# Patient Record
Sex: MALE | Race: WHITE | Employment: OTHER | URBAN - METROPOLITAN AREA
[De-identification: names, ages, dates, MRNs, and addresses within clinical notes are randomized per-mention and may not be internally consistent; named-entity substitution may affect disease eponyms.]

---

## 2017-01-01 ENCOUNTER — HOSPITAL ENCOUNTER (EMERGENCY)
Age: 82
Discharge: HOME | End: 2017-07-02
Attending: EMERGENCY MEDICINE
Payer: MEDICARE

## 2017-01-01 ENCOUNTER — HOME CARE VISIT (OUTPATIENT)
Dept: SCHEDULING | Facility: HOME HEALTH | Age: 82
End: 2017-01-01
Payer: MEDICARE

## 2017-01-01 ENCOUNTER — OFFICE VISIT (OUTPATIENT)
Dept: CARDIOLOGY CLINIC | Age: 82
End: 2017-01-01

## 2017-01-01 ENCOUNTER — TELEPHONE (OUTPATIENT)
Dept: CARDIOLOGY CLINIC | Age: 82
End: 2017-01-01

## 2017-01-01 ENCOUNTER — HOME CARE VISIT (OUTPATIENT)
Dept: HOSPICE | Facility: HOSPICE | Age: 82
End: 2017-01-01
Payer: MEDICARE

## 2017-01-01 ENCOUNTER — HOSPITAL ENCOUNTER (OUTPATIENT)
Dept: CT IMAGING | Age: 82
Discharge: HOME | End: 2017-01-10
Attending: INTERNAL MEDICINE
Payer: MEDICARE

## 2017-01-01 ENCOUNTER — HOSPICE ADMISSION (OUTPATIENT)
Dept: HOSPICE | Facility: HOSPICE | Age: 82
End: 2017-01-01
Payer: MEDICARE

## 2017-01-01 VITALS
BODY MASS INDEX: 16.23 KG/M2 | SYSTOLIC BLOOD PRESSURE: 152 MMHG | RESPIRATION RATE: 24 BRPM | OXYGEN SATURATION: 91 % | DIASTOLIC BLOOD PRESSURE: 75 MMHG | HEIGHT: 66 IN | TEMPERATURE: 98.2 F | HEART RATE: 58 BPM | WEIGHT: 101 LBS

## 2017-01-01 VITALS
OXYGEN SATURATION: 60 % | HEIGHT: 66 IN | RESPIRATION RATE: 28 BRPM | WEIGHT: 101.8 LBS | DIASTOLIC BLOOD PRESSURE: 58 MMHG | HEART RATE: 52 BPM | BODY MASS INDEX: 16.36 KG/M2 | SYSTOLIC BLOOD PRESSURE: 118 MMHG | TEMPERATURE: 97.3 F

## 2017-01-01 VITALS
OXYGEN SATURATION: 97 % | BODY MASS INDEX: 16.74 KG/M2 | DIASTOLIC BLOOD PRESSURE: 84 MMHG | HEART RATE: 53 BPM | WEIGHT: 104.2 LBS | HEIGHT: 66 IN | SYSTOLIC BLOOD PRESSURE: 162 MMHG | RESPIRATION RATE: 20 BRPM

## 2017-01-01 VITALS
WEIGHT: 101 LBS | OXYGEN SATURATION: 99 % | DIASTOLIC BLOOD PRESSURE: 86 MMHG | HEART RATE: 59 BPM | HEIGHT: 66 IN | SYSTOLIC BLOOD PRESSURE: 142 MMHG | BODY MASS INDEX: 16.23 KG/M2 | RESPIRATION RATE: 16 BRPM

## 2017-01-01 DIAGNOSIS — D50.9 IRON DEFICIENCY ANEMIA, UNSPECIFIED: Chronic | ICD-10-CM

## 2017-01-01 DIAGNOSIS — M54.5 CHRONIC MIDLINE LOW BACK PAIN, WITH SCIATICA PRESENCE UNSPECIFIED: ICD-10-CM

## 2017-01-01 DIAGNOSIS — I25.10 ATHEROSCLEROSIS OF NATIVE CORONARY ARTERY OF NATIVE HEART WITHOUT ANGINA PECTORIS: ICD-10-CM

## 2017-01-01 DIAGNOSIS — R00.1 SINUS BRADYCARDIA: ICD-10-CM

## 2017-01-01 DIAGNOSIS — I11.9 BENIGN HYPERTENSIVE HEART DISEASE WITHOUT HEART FAILURE: ICD-10-CM

## 2017-01-01 DIAGNOSIS — I48.92 ATRIAL FLUTTER, PAROXYSMAL (HCC): ICD-10-CM

## 2017-01-01 DIAGNOSIS — I50.22 CHRONIC SYSTOLIC HEART FAILURE (HCC): Primary | ICD-10-CM

## 2017-01-01 DIAGNOSIS — I42.0 DILATED CARDIOMYOPATHY (HCC): ICD-10-CM

## 2017-01-01 DIAGNOSIS — G89.29 CHRONIC MIDLINE LOW BACK PAIN, WITH SCIATICA PRESENCE UNSPECIFIED: ICD-10-CM

## 2017-01-01 DIAGNOSIS — I25.10 ATHEROSCLEROSIS OF NATIVE CORONARY ARTERY OF NATIVE HEART WITHOUT ANGINA PECTORIS: Primary | ICD-10-CM

## 2017-01-01 DIAGNOSIS — I47.1 ATRIAL TACHYCARDIA (HCC): ICD-10-CM

## 2017-01-01 DIAGNOSIS — I50.22 CHRONIC SYSTOLIC HEART FAILURE (HCC): ICD-10-CM

## 2017-01-01 DIAGNOSIS — I87.2 VENOUS INSUFFICIENCY: ICD-10-CM

## 2017-01-01 DIAGNOSIS — J43.1 PANLOBULAR EMPHYSEMA (HCC): ICD-10-CM

## 2017-01-01 DIAGNOSIS — I95.1 ORTHOSTATIC HYPOTENSION: ICD-10-CM

## 2017-01-01 DIAGNOSIS — J44.9 CHRONIC OBSTRUCTIVE PULMONARY DISEASE, UNSPECIFIED COPD TYPE (HCC): ICD-10-CM

## 2017-01-01 DIAGNOSIS — J84.112 IDIOPATHIC PULMONARY FIBROSIS (HCC): ICD-10-CM

## 2017-01-01 DIAGNOSIS — I50.23 ACUTE ON CHRONIC SYSTOLIC CONGESTIVE HEART FAILURE (HCC): Primary | ICD-10-CM

## 2017-01-01 DIAGNOSIS — I49.3 FREQUENT PVCS: ICD-10-CM

## 2017-01-01 DIAGNOSIS — Z71.89 ADVANCED CARE PLANNING/COUNSELING DISCUSSION: ICD-10-CM

## 2017-01-01 DIAGNOSIS — S61.219A LACERATION OF FINGER, INITIAL ENCOUNTER: Primary | ICD-10-CM

## 2017-01-01 DIAGNOSIS — R13.10 DYSPHAGIA, UNSPECIFIED TYPE: ICD-10-CM

## 2017-01-01 LAB
BUN SERPL-MCNC: 25 MG/DL (ref 10–36)
BUN/CREAT SERPL: 34 (ref 10–24)
CALCIUM SERPL-MCNC: 9 MG/DL (ref 8.6–10.2)
CHLORIDE SERPL-SCNC: 91 MMOL/L (ref 96–106)
CO2 SERPL-SCNC: 27 MMOL/L (ref 18–29)
CREAT SERPL-MCNC: 0.73 MG/DL (ref 0.76–1.27)
GLUCOSE SERPL-MCNC: 103 MG/DL (ref 65–99)
POTASSIUM SERPL-SCNC: 4.7 MMOL/L (ref 3.5–5.2)
SODIUM SERPL-SCNC: 135 MMOL/L (ref 134–144)
TSH SERPL DL<=0.005 MIU/L-ACNC: 1.08 UIU/ML (ref 0.45–4.5)

## 2017-01-01 PROCEDURE — G0299 HHS/HOSPICE OF RN EA 15 MIN: HCPCS

## 2017-01-01 PROCEDURE — 90471 IMMUNIZATION ADMIN: CPT

## 2017-01-01 PROCEDURE — 3336500001 HSPC ELECTION

## 2017-01-01 PROCEDURE — 71250 CT THORAX DX C-: CPT

## 2017-01-01 PROCEDURE — 77030010507 HC ADH SKN DERMBND J&J -B

## 2017-01-01 PROCEDURE — 74011250636 HC RX REV CODE- 250/636: Performed by: PHYSICIAN ASSISTANT

## 2017-01-01 PROCEDURE — HOSPICE MEDICATION HC HH HOSPICE MEDICATION

## 2017-01-01 PROCEDURE — 0651 HSPC ROUTINE HOME CARE

## 2017-01-01 PROCEDURE — 99285 EMERGENCY DEPT VISIT HI MDM: CPT

## 2017-01-01 PROCEDURE — 75810000293 HC SIMP/SUPERF WND  RPR

## 2017-01-01 PROCEDURE — 90715 TDAP VACCINE 7 YRS/> IM: CPT | Performed by: PHYSICIAN ASSISTANT

## 2017-01-01 RX ORDER — CARVEDILOL 6.25 MG/1
6.25 TABLET ORAL 2 TIMES DAILY WITH MEALS
Qty: 14 TAB | Refills: 3 | Status: SHIPPED | OUTPATIENT
Start: 2017-01-01

## 2017-01-01 RX ORDER — FUROSEMIDE 20 MG/1
20 TABLET ORAL DAILY
Qty: 30 TAB | Refills: 3
Start: 2017-01-01

## 2017-01-01 RX ORDER — CARVEDILOL 6.25 MG/1
TABLET ORAL
Qty: 180 TAB | Refills: 3 | Status: SHIPPED | OUTPATIENT
Start: 2017-01-01 | End: 2017-01-01 | Stop reason: SDUPTHER

## 2017-01-01 RX ORDER — CARVEDILOL 6.25 MG/1
3.12 TABLET ORAL 2 TIMES DAILY WITH MEALS
Qty: 180 TAB | Refills: 3
Start: 2017-01-01 | End: 2017-01-01 | Stop reason: SDUPTHER

## 2017-01-01 RX ADMIN — TETANUS TOXOID, REDUCED DIPHTHERIA TOXOID AND ACELLULAR PERTUSSIS VACCINE, ADSORBED 0.5 ML: 5; 2.5; 8; 8; 2.5 SUSPENSION INTRAMUSCULAR at 18:05

## 2017-02-21 NOTE — PROGRESS NOTES
HISTORY OF PRESENT ILLNESS  Reggie Person is a 80 y.o. male. Last seen 3 months ago. Problem List  Date Reviewed: 2/21/2017          Codes Class Noted    Dilated cardiomyopathy ([facility] ICD-10-CM: I42.0  ICD-9-CM: 425.4  11/28/2016        Chronic systolic heart failure ([facility] ICD-10-CM: I50.22  ICD-9-CM: 428.22  11/28/2016        Orthostatic hypotension ICD-10-CM: I95.1  ICD-9-CM: 458.0  10/14/2014        Iron deficiency anemia, unspecified (Chronic) ICD-10-CM: D50.9  ICD-9-CM: 280.9  4/16/2014        Venous insufficiency ICD-10-CM: I87.2  ICD-9-CM: 459.81  Unknown        Coronary atherosclerosis of native coronary artery ICD-10-CM: I25.10  ICD-9-CM: 414.01  Unknown        Benign hypertensive heart disease without heart failure ICD-10-CM: I11.9  ICD-9-CM: 402.10  Unknown        COPD (chronic obstructive pulmonary disease) ([facility] ICD-10-CM: J44.9  ICD-9-CM: 496  Unknown            Cardiac testing  CATH: 2004: Stent-LAD  ECHO: 2010 EF 40-45%, LAE, ? PFO  CATH: 2010 : non-obst CAD, EF: 35%, LV diffuse HK   ECHO: 12/13/12 - EF 40%, global HK, severe LAE, moderate NORBERTO, mild MR  ECHO: 1/2014: EF 55%, PA 50 mmHg  ECHO: 4/18/2014: EF 55%, grade 1 DD, mild MR/AI/TR  ECHO: 11/21/16 - dilated LVEF 20%, global HK, moderate MR.    HPI  Mr. Zabrina Green reports that his dyspnea remains his primary concern. He now reports dyspnea at rest. Continues to use oxygen therapy 24/7. Followed closely by Dr. Bipin Tapia. He also complaints of severe pain in his back and hips due to hx of compression fractures. He reports that he is able to sleep well throughout the night, usually laying on his back. He is on chronic pain medication as prescribed by Dr. Alejandra Caballero. He was recently diagnosed with skin cancer and is concerned about potential need for surgical intervention. Patient denies any exertional chest pain, palpitations, syncope, orthopnea, edema or paroxysmal nocturnal dyspnea. He is here today with his daughter.    Past Medical History   Diagnosis Date    Benign hypertensive heart disease without heart failure     COPD (chronic obstructive pulmonary disease) (HCC)      oxygen requiring    Coronary atherosclerosis of native coronary artery 1970's     MI, PCI LAD 2004    DDD (degenerative disc disease), lumbar      med rx    Interstitial lung disease ([facility]     Ischemic cardiomyopathy     PAD (peripheral artery disease) ([facility]      right iliac stent    Pure hypercholesterolemia     PVC's (premature ventricular contractions) 6/11/2012    Shingles 2/2012     thoracic region    Venous insufficiency      Current Outpatient Prescriptions   Medication Sig Dispense Refill    carvedilol (COREG) 6.25 mg tablet TAKE 1 TABLET TWICE A DAY WITH MEALS 180 Tab 3    MULTIVITAMIN PO Take  by mouth.  acetaminophen (TYLENOL) 325 mg tablet Take  by mouth every four (4) hours as needed for Pain.  albuterol-ipratropium (DUO-NEB) 2.5 mg-0.5 mg/3 ml nebulizer solution 3 mL by Nebulization route once.  IRON/VITAMIN B COMPLEX (GERITOL PO) Take  by mouth.  pantoprazole (PROTONIX) 40 mg tablet Take 40 mg by mouth daily.  MISCELLANEOUS MEDICAL SUPPLY (OXYGEN TUBING) 2.5 L by Does Not Apply route.  predniSONE (DELTASONE) 2.5 mg tablet Take 5 mg by mouth daily.  budesonide-formoterol (SYMBICORT) 160-4.5 mcg/Actuation HFA inhaler Take 2 Puffs by inhalation two (2) times a day.  tiotropium (SPIRIVA WITH HANDIHALER) 18 mcg inhalation capsule Take 1 Cap by inhalation daily.  atorvastatin (LIPITOR) 20 mg tablet Take 20 mg by mouth nightly. Allergies   Allergen Reactions    Ivp [Fd And C Blue No.1] Unknown (comments)     Review of Systems   Constitutional: Negative for fever, chills, fatigue and diaphoresis. Respiratory: Negative for cough, dyspnea, hemoptysis, sputum production and wheezing. Positive for dyspnea on exertion. Cardiovascular: Negative for chest pain, palpitations, orthopnea, claudication, leg swelling and PND. Gastrointestinal: Negative for heartburn, nausea, vomiting, blood in stool and melena. Genitourinary: Negative for dysuria and flank pain. Musculoskeletal: Positive for back pain and joint pain. Skin: Negative for rash. Neurological: Negative for focal weakness, seizures, loss of consciousness, and headaches. Positive for generalized weakness. Endo/Heme/Allergies: Does bruise easily. No abnormal bleeding. Psychiatric/Behavioral: Negative for memory loss. The patient does not have insomnia. Visit Vitals    /86 (BP 1 Location: Right arm, BP Patient Position: Sitting)    Pulse (!) 59    Resp 16    Ht 5' 6\" (1.676 m)    Wt 101 lb (45.8 kg)    SpO2 99%    BMI 16.3 kg/m2     Wt Readings from Last 3 Encounters:   02/21/17 101 lb (45.8 kg)   11/21/16 98 lb (44.5 kg)   05/16/16 104 lb (47.2 kg)     Physical Exam   Vitals reviewed. Constitutional: He is oriented to person, place, and time. Neck: Neck supple. No JVD present. Cardiovascular: Normal rate, regular rhythm, S1 normal, S2 normal and normal heart sounds. Frequent extrasystoles are present. Exam reveals no gallop and no friction rub. No murmur heard. Pulses:       Carotid pulses are 2+ on the right side, and 2+ on the left side. Dorsalis pedis pulses are 2+ on the right side, and 2+ on the left side. Pulmonary/Chest: Effort normal and breath sounds normal. He has no wheezes. He has no rales. Abdominal: Soft. Bowel sounds are normal.   Musculoskeletal: He exhibits no edema  Neurological: He is alert and oriented to person, place, and time. Skin: Skin is warm and dry. Psychiatric: He has a normal mood and affect. Cardiographics  EKG 4/30/15 - SR with frequent PVCs and rare couplets  EKG 5/16/16 - SR 92, frequent PAC/PVC's. EKG 11/2016 - SR, frequent PVC's, RBBB  ASSESSMENT and PLAN  Encounter Diagnoses   Name Primary?     Atherosclerosis of native coronary artery of native heart without angina pectoris Yes    Benign hypertensive heart disease without heart failure     Venous insufficiency     Orthostatic hypotension     Dilated cardiomyopathy (HCC)     Chronic systolic heart failure (HCC)     Chronic obstructive pulmonary disease, unspecified COPD type (HCC)     Iron deficiency anemia, unspecified      Mr Farida Gunderson has chronic systolic heart failure, likely non-ischemic in etiology. Chronic class 3 dyspnea is likely multifactorial in the setting of advanced COPD. He has no signs or symptoms of volume overload. His functional capacity is limited by chronic low back pain. He has known CAD with remote PCI. Cath 2010 demonstrated non obstructive disease. No symptoms of exertional angina. Given his advanced age, conservative management is best. He has a tendency towards ortho stasis thus will avoid ARB at this time. We reviewed the signs and symptoms for volume overload. Will continue to monitor. Follow-up Disposition:  Return in about 4 months (around 6/21/2017).      CACHORRO Rehman MD

## 2017-02-21 NOTE — MR AVS SNAPSHOT
Visit Information Date & Time Provider Department Dept. Phone Encounter #  
 2/21/2017  1:40 PM Ho Thomas MD [facility] [phone] [id number] Follow-up Instructions Return in about 4 months (around 6/21/2017). Upcoming Health Maintenance Date Due DTaP/Tdap/Td series (1 - Tdap) 5/23/1946 ZOSTER VACCINE AGE 60> 5/23/1985 GLAUCOMA SCREENING Q2Y 5/23/1990 MEDICARE YEARLY EXAM 5/23/1990 Pneumococcal 65+ Low/Medium Risk (2 of 2 - PCV13) 4/10/2015 INFLUENZA AGE 9 TO ADULT 8/1/2016 Allergies as of 2/21/2017  Review Complete On: 2/21/2017 By: Meliza Grimes NP Severity Noted Reaction Type Reactions Ivp [Fd And C Blue No.1]  12/29/2010    Unknown (comments) Current Immunizations  Never Reviewed Name Date Pneumococcal Polysaccharide (PPSV-23) 4/10/2014 11:03 AM  
  
 Not reviewed this visit You Were Diagnosed With   
  
 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 4 Chronic systolic heart failure (HCC)     ICD-10-CM: I50.22 ICD-9-CM: 428.22 Chronic obstructive pulmonary disease, unspecified COPD type ([facility]     ICD-10-CM: J44.9 ICD-9-CM: 572 Iron deficiency anemia, unspecified     ICD-10-CM: D50.9 ICD-9-CM: 280.9 Vitals BP Pulse Resp Height(growth percentile) Weight(growth percentile) SpO2  
 142/86 (BP 1 Location: Right arm, BP Patient Position: Sitting) (!) 59 16 5' 6\" (1.676 m) 101 lb (45.8 kg) 99% BMI Smoking Status 16.3 kg/m2 Former Smoker Vitals History BMI and BSA Data Body Mass Index Body Surface Area  
 16.3 kg/m 2 1.46 m 2 Preferred Pharmacy Pharmacy Name [address] [phone] Your Updated Medication List  
  
   
This list is accurate as of: 2/21/17  2:52 PM.  Always use your most recent med list.  
  
  
  
  
 acetaminophen 325 mg tablet Commonly known as:  TYLENOL Take  by mouth every four (4) hours as needed for Pain. albuterol-ipratropium 2.5 mg-0.5 mg/3 ml Nebu Commonly known as:  DUO-NEB  
3 mL by Nebulization route once. carvedilol 6.25 mg tablet Commonly known as:  COREG  
TAKE 1 TABLET TWICE A DAY WITH MEALS  
  
 GERITOL PO Take  by mouth. LIPITOR 20 mg tablet Generic drug:  atorvastatin Take 20 mg by mouth nightly. MULTIVITAMIN PO Take  by mouth. OXYGEN TUBING  
2.5 L by Does Not Apply route. predniSONE 2.5 mg tablet Commonly known as:  Alana Pound Take 5 mg by mouth daily. PROTONIX 40 mg tablet Generic drug:  pantoprazole Take 40 mg by mouth daily. SPIRIVA WITH HANDIHALER 18 mcg inhalation capsule Generic drug:  tiotropium Take 1 Cap by inhalation daily. SYMBICORT 160-4.5 mcg/actuation HFA inhaler Generic drug:  budesonide-formoterol Take 2 Puffs by inhalation two (2) times a day. Follow-up Instructions Return in about 4 months (around 6/21/2017). Patient Instructions Please continue your current medication regimen. Please call us with any questions or concerns prior to your next office visit. Introducing [facility]! Rajni Kauffman introduces [email or web address] patient portal. Now you can access parts of your medical record, email your doctor's office, and request medication refills online. 1. In your internet browser, go to [email or web address] 2. Click on the First Time User? Click Here link in the Sign In box.  You will see the New Member Sign Up page. 3. Enter your [employer] Access Code exactly as it appears below. You will not need to use this code after youve completed the sign-up process. If you do not sign up before the expiration date, you must request a new code. · [employer] Access Code: [email or web address] Expires: 5/22/2017  2:52 PM 
 
4. Enter the last four digits of your Social Security Number (xxxx) and Date of Birth (mm/dd/yyyy) as indicated and click Submit. You will be taken to the next sign-up page. 5. Create a [employer] ID. This will be your [employer] login ID and cannot be changed, so think of one that is secure and easy to remember. 6. Create a [employer] password. You can change your password at any time. 7. Enter your Password Reset Question and Answer. This can be used at a later time if you forget your password. 8. Enter your e-mail address. You will receive e-mail notification when new information is available in [address]. 9. Click Sign Up. You can now view and download portions of your medical record. 10. Click the Download Summary menu link to download a portable copy of your medical information. If you have questions, please visit the Frequently Asked Questions section of the [employer] website. Remember, [employer] is NOT to be used for urgent needs. For medical emergencies, dial 911. Now available from your iPhone and Android! Please provide this summary of care documentation to your next provider. Your primary care clinician is listed as Ayad Parker. If you have any questions after today's visit, please call [phone].

## 2017-02-21 NOTE — PATIENT INSTRUCTIONS
Please continue your current medication regimen. Please call us with any questions or concerns prior to your next office visit.

## 2017-05-31 PROBLEM — J43.1 PANLOBULAR EMPHYSEMA (HCC): Status: ACTIVE | Noted: 2017-01-01

## 2017-05-31 PROBLEM — R13.10 DYSPHAGIA: Status: ACTIVE | Noted: 2017-01-01

## 2017-05-31 PROBLEM — I49.3 FREQUENT PVCS: Status: ACTIVE | Noted: 2017-01-01

## 2017-05-31 NOTE — PATIENT INSTRUCTIONS
Reduce Carvedilol (Coreg) to 1/2 tablet twice daily    Resume Furosemide 20 mg daily    Monitor weight everday

## 2017-05-31 NOTE — PROGRESS NOTES
HISTORY OF PRESENT ILLNESS  Davina Ventura is a 80 y.o. male. Last seen 3 months ago. Problem List  Date Reviewed: 2/21/2017          Codes Class Noted    Frequent PVCs ICD-10-CM: I49.3  ICD-9-CM: 427.69  5/31/2017        Panlobular emphysema ([facility] ICD-10-CM: J43.1  ICD-9-CM: 492.8  5/31/2017        Dysphagia ICD-10-CM: R13.10  ICD-9-CM: 787.20  5/31/2017        Dilated cardiomyopathy ([facility] ICD-10-CM: I42.0  ICD-9-CM: 425.4  11/28/2016        Chronic systolic heart failure ([facility] ICD-10-CM: I50.22  ICD-9-CM: 428.22  11/28/2016        Orthostatic hypotension ICD-10-CM: I95.1  ICD-9-CM: 458.0  10/14/2014        Iron deficiency anemia, unspecified (Chronic) ICD-10-CM: D50.9  ICD-9-CM: 280.9  4/16/2014        Venous insufficiency ICD-10-CM: I87.2  ICD-9-CM: 459.81  Unknown        Coronary atherosclerosis of native coronary artery ICD-10-CM: I25.10  ICD-9-CM: 414.01  Unknown        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 PFO  CATH: 2010 : non-obst CAD, EF: 35%, LV diffuse HK   ECHO: 12/13/12 - EF 40%, global HK, severe LAE, moderate NORBERTO, mild MR  ECHO: 1/2014: EF 55%, PA 50 mmHg  ECHO: 4/18/2014: EF 55%, grade 1 DD, mild MR/AI/TR  ECHO: 11/21/16 - dilated LVEF 20%, global HK, moderate MR.    HPI  Mr. Gabriel Humphreys is here for recent weight gain and worsening PRESSLEY. Increased LE edema noted. He has been on Lasix 20 mg in the past.     Patient saw Dr. Prosper Rueda 4 weeks ago. Pt had a chest CT in Fall 2016. He didn't have any signs of fibrosis but has COPD. His pulse was noted to be in the high 40s    He has had dysphgaia to solids but not to liquid for the past 2-3 months. No aspiration sxs. Saw Dr. Paula Caraballo who recommended EGD. Patient denies any exertional chest pain, syncope, orthopnea, or paroxysmal nocturnal dyspnea. He is here today with his daughter.    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 heart failure     COPD (chronic obstructive pulmonary disease) (HCC)     oxygen requiring    Coronary atherosclerosis of native coronary artery 1970's    MI, PCI LAD 2004    DDD (degenerative disc disease), lumbar     med rx    Interstitial lung disease ([facility]     Ischemic cardiomyopathy     PAD (peripheral artery disease) ([facility]     right iliac stent    Pure hypercholesterolemia     PVC's (premature ventricular contractions) 6/11/2012    Shingles 2/2012    thoracic region    Venous insufficiency      Current Outpatient Prescriptions   Medication Sig Dispense Refill    carvedilol (COREG) 6.25 mg tablet TAKE 1 TABLET TWICE A DAY WITH MEALS 180 Tab 3    MULTIVITAMIN PO Take  by mouth.  acetaminophen (TYLENOL) 325 mg tablet Take  by mouth every four (4) hours as needed for Pain.  albuterol-ipratropium (DUO-NEB) 2.5 mg-0.5 mg/3 ml nebulizer solution 3 mL by Nebulization route once.  IRON/VITAMIN B COMPLEX (GERITOL PO) Take  by mouth.  pantoprazole (PROTONIX) 40 mg tablet Take 40 mg by mouth daily.  predniSONE (DELTASONE) 2.5 mg tablet Take 5 mg by mouth daily.  atorvastatin (LIPITOR) 20 mg tablet Take 20 mg by mouth nightly.  budesonide-formoterol (SYMBICORT) 160-4.5 mcg/Actuation HFA inhaler Take 2 Puffs by inhalation two (2) times a day.  tiotropium (SPIRIVA WITH HANDIHALER) 18 mcg inhalation capsule Take 1 Cap by inhalation daily.  MISCELLANEOUS MEDICAL SUPPLY (OXYGEN TUBING) 2.5 L by Does Not Apply route. Allergies   Allergen Reactions    Ivp [Fd And C Blue No.1] Unknown (comments)     Review of Systems   Constitutional: Negative for fever, chills, fatigue and diaphoresis. Respiratory: Negative for cough, dyspnea, hemoptysis, sputum production and wheezing. Positive for dyspnea on exertion. Cardiovascular: Negative for chest pain, palpitations, orthopnea, claudication, leg swelling and PND. Gastrointestinal: Negative for heartburn, nausea, vomiting, blood in stool and melena. Genitourinary: Negative for dysuria and flank pain. Musculoskeletal: Positive for back pain and joint pain. Skin: Negative for rash. Neurological: Negative for focal weakness, seizures, loss of consciousness, and headaches. Positive for generalized weakness. Endo/Heme/Allergies: Does bruise easily. No abnormal bleeding. Psychiatric/Behavioral: Negative for memory loss. The patient does not have insomnia. Visit Vitals    /84 (BP 1 Location: Left arm, BP Patient Position: Sitting)    Pulse (!) 53    Resp 20    Ht 5' 6\" (1.676 m)    Wt 104 lb 3.2 oz (47.3 kg)    SpO2 97%    BMI 16.82 kg/m2     Wt Readings from Last 3 Encounters:   05/31/17 104 lb 3.2 oz (47.3 kg)   02/21/17 101 lb (45.8 kg)   11/21/16 98 lb (44.5 kg)     Physical Exam   Vitals reviewed. Constitutional: He is oriented to person, place, and time. Neck: Neck supple. No JVD present. Cardiovascular: Normal rate, regular rhythm, S1 normal, S2 normal and normal heart sounds. Frequent extrasystoles are present. Exam reveals no gallop and no friction rub. No murmur heard. Pulses:       Carotid pulses are 2+ on the right side, and 2+ on the left side. Dorsalis pedis pulses are 2+ on the right side, and 2+ on the left side. Pulmonary/Chest: Effort normal and breath sounds normal. He has no wheezes. He has no rales. Abdominal: Soft. Bowel sounds are normal.   Musculoskeletal: 2+ left>right ankle edema  Neurological: He is alert and oriented to person, place, and time. Skin: Skin is warm and dry. Psychiatric: He has a normal mood and affect. Cardiographics  EKG 4/30/15 - SR with frequent PVCs and rare couplets  EKG 5/16/16 - SR 92, frequent PAC/PVC's. EKG 11/2016 - SR, frequent PVC's, RBBB  ASSESSMENT and PLAN  Encounter Diagnoses   Name Primary?     Chronic systolic heart failure (HCC) Yes    Dilated cardiomyopathy ([facility]     Atherosclerosis of native coronary artery of native heart without angina pectoris     Benign hypertensive heart disease without heart failure     Iron deficiency anemia, unspecified     Orthostatic hypotension     Venous insufficiency     Sinus bradycardia     Frequent PVCs     Panlobular emphysema (HCC)     Dysphagia, unspecified type      Mr Karin Allen has progressive PRESSLEY, peripheral edema in the setting of advanced COPD, severe LV dysfunction, and asymptomatic PVCs. Pulmonary evaluation including chest CT in 01/2017 demonstrated mild-moderate emphysema but no pulmonary fibrosis. His functional capacity has been extremely limited by chronic low back pain. His HR is in the 50s with frequency ectoptic beats. He has no sxs of bradycardia. I favor resumption of diuretic therapy-Lasix 20 mg daily coupled with dose reduction of Carvedilol to 3.125 mg BID. He will monitor his weight and vital signs at home. For the most part he is adherent to a low sodium diet. Return in two weeks with EKG. Systolic BP while high today has been fine at home. He has a tendency towards orthostasis so we have avoided ACE/ARB. Follow-up Disposition:  Return in about 2 weeks (around 6/14/2017).    See Mary Proffer    Written by Kristina Mensah, as dictated by Meeta Guardado MD.     Charley Yancey MD

## 2017-05-31 NOTE — MR AVS SNAPSHOT
Visit Information Date & Time Provider Department Dept. Phone Encounter #  
 5/31/2017 10:00 AM Katja Chamberlain MD [facility] [phone] [id number] Follow-up Instructions Return in about 2 weeks (around 6/14/2017). Your Appointments 6/20/2017  2:40 PM  
ESTABLISHED PATIENT with Katja Chamberlain MD  
[facility] ([facility] SCHEDULING) Appt Note: 4 mo fup  
 [address] [facility] 600 1007 [address] [address] [address] Upcoming Health Maintenance Date Due DTaP/Tdap/Td series (1 - Tdap) 5/23/1946 ZOSTER VACCINE AGE 60> 5/23/1985 GLAUCOMA SCREENING Q2Y 5/23/1990 MEDICARE YEARLY EXAM 5/23/1990 Pneumococcal 65+ Low/Medium Risk (2 of 2 - PCV13) 4/10/2015 INFLUENZA AGE 9 TO ADULT 8/1/2017 Allergies as of 5/31/2017  Review Complete On: 5/31/2017 By: Royce Rai LPN Severity Noted Reaction Type Reactions Ivp [Fd And C Blue No.1]  12/29/2010    Unknown (comments) Current Immunizations  Never Reviewed Name Date Pneumococcal Polysaccharide (PPSV-23) 4/10/2014 11:03 AM  
  
 Not reviewed this visit You Were Diagnosed With   
  
 Codes Comments Dilated cardiomyopathy ([facility] [address]    -  Primary ICD-10-CM: I42.0 ICD-9-CM: 425. 4 Chronic systolic heart failure (HCC)     ICD-10-CM: I50.22 ICD-9-CM: 428.22 Atherosclerosis of native coronary artery of native heart without angina pectoris     ICD-10-CM: I25.10 ICD-9-CM: 414.01 Benign hypertensive heart disease without heart failure     ICD-10-CM: I11.9 ICD-9-CM: 402.10 Iron deficiency anemia, unspecified     ICD-10-CM: D50.9 ICD-9-CM: 280.9 Orthostatic hypotension     ICD-10-CM: I95.1 ICD-9-CM: 458.0 Venous insufficiency     ICD-10-CM: I87.2 ICD-9-CM: 459.81 Vitals BP Pulse Resp Height(growth percentile) Weight(growth percentile) SpO2  
 162/84 (BP 1 Location: Left arm, BP Patient Position: Sitting) (!) 53 20 5' 6\" (1.676 m) 104 lb 3.2 oz (47.3 kg) 97% BMI Smoking Status 16.82 kg/m2 Former Smoker BMI and BSA Data Body Mass Index Body Surface Area  
 16.82 kg/m 2 1.48 m 2 Preferred Pharmacy Pharmacy Name Phone [id number] [address] [phone] Your Updated Medication List  
  
   
This list is accurate as of: 5/31/17 10:32 AM.  Always use your most recent med list.  
  
  
  
  
 acetaminophen 325 mg tablet Commonly known as:  TYLENOL Take  by mouth every four (4) hours as needed for Pain. albuterol-ipratropium 2.5 mg-0.5 mg/3 ml Nebu Commonly known as:  DUO-NEB  
3 mL by Nebulization route once. carvedilol 6.25 mg tablet Commonly known as:  COREG  
TAKE 1 TABLET TWICE A DAY WITH MEALS  
  
 GERITOL PO Take  by mouth. LIPITOR 20 mg tablet Generic drug:  atorvastatin Take 20 mg by mouth nightly. MULTIVITAMIN PO Take  by mouth. OXYGEN TUBING  
2.5 L by Does Not Apply route. predniSONE 2.5 mg tablet Commonly known as:  Franky Sharpeief Take 5 mg by mouth daily. PROTONIX 40 mg tablet Generic drug:  pantoprazole Take 40 mg by mouth daily. SPIRIVA WITH HANDIHALER 18 mcg inhalation capsule Generic drug:  tiotropium Take 1 Cap by inhalation daily. SYMBICORT 160-4.5 mcg/actuation HFA inhaler Generic drug:  budesonide-formoterol Take 2 Puffs by inhalation two (2) times a day. Follow-up Instructions Return in about 2 weeks (around 6/14/2017). Patient Instructions Reduce Carvedilol (Coreg) to 1/2 tablet twice daily Resume Furosemide 20 mg daily Monitor weight everday Introducing \[facility]\"" & HEALTH SERVICES!    
 [facility] [employer] introduces [employer] patient portal. Now you can access parts of your medical record, email your doctor's office, and request medication refills online. 1. In your internet browser, go to [email or web address] 2. Click on the First Time User? Click Here link in the Sign In box. You will see the New Member Sign Up page. 3. Enter your [employer] Access Code exactly as it appears below. You will not need to use this code after youve completed the sign-up process. If you do not sign up before the expiration date, you must request a new code. · [employer] Access Code: [email or web address] Expires: 8/29/2017 10:32 AM 
 
4. Enter the last four digits of your Social Security Number (xxxx) and Date of Birth (mm/dd/yyyy) as indicated and click Submit. You will be taken to the next sign-up page. 5. Create a Beintoo ID. This will be your Be[employer] login ID and cannot be changed, so think of one that is secure and easy to remember. 6. Create a [employer] password. You can change your password at any time. 7. Enter your Password Reset Question and Answer. This can be used at a later time if you forget your password. 8. Enter your e-mail address. You will receive e-mail notification when new information is available in [address] 9. Click Sign Up. You can now view and download portions of your medical record. 10. Click the Download Summary menu link to download a portable copy of your medical information. If you have questions, please visit the Frequently Asked Questions section of the [employer] website. Remember, [employer] is NOT to be used for urgent needs. For medical emergencies, dial 911. Now available from your iPhone and Android! Please provide this summary of care documentation to your next provider. Your primary care clinician is listed as Ailyn Juarez. If you have any questions after today's visit, please call [phone].

## 2017-06-20 PROBLEM — I48.92 ATRIAL FLUTTER, PAROXYSMAL (HCC): Status: ACTIVE | Noted: 2017-01-01

## 2017-06-20 NOTE — PROGRESS NOTES
HISTORY OF PRESENT ILLNESS    Deb Velásquez is a 80 y.o. male. Last seen 3 months ago. Problem List  Date Reviewed: 2/21/2017          Codes Class Noted    Frequent PVCs ICD-10-CM: I49.3  ICD-9-CM: 427.69  5/31/2017        Panlobular emphysema ([facility] ICD-10-CM: J43.1  ICD-9-CM: 492.8  5/31/2017        Dysphagia ICD-10-CM: R13.10  ICD-9-CM: 787.20  5/31/2017        Dilated cardiomyopathy ([facility] ICD-10-CM: I42.0  ICD-9-CM: 425.4  11/28/2016        Chronic systolic heart failure ([facility] ICD-10-CM: I50.22  ICD-9-CM: 428.22  11/28/2016        Orthostatic hypotension ICD-10-CM: I95.1  ICD-9-CM: 458.0  10/14/2014        Iron deficiency anemia, unspecified (Chronic) ICD-10-CM: D50.9  ICD-9-CM: 280.9  4/16/2014        Venous insufficiency ICD-10-CM: I87.2  ICD-9-CM: 459.81  Unknown        Coronary atherosclerosis of native coronary artery ICD-10-CM: I25.10  ICD-9-CM: 414.01  Unknown        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 PFO  CATH: 2010 : non-obst CAD, EF: 35%, LV diffuse HK   ECHO: 12/13/12 - EF 40%, global HK, severe LAE, moderate NORBERTO, mild MR  ECHO: 1/2014: EF 55%, PA 50 mmHg  ECHO: 4/18/2014: EF 55%, grade 1 DD, mild MR/AI/TR  ECHO: 11/21/16 - dilated LVEF 20%, global HK, moderate MR.    HPI  He reports progressive PRESSLEY walking short distances and with normal activity. He is on continuous oxygen therapy. Just talking results in dyspnea. He is able to feel his racing heart beats, but does not report any chest pain. Adherent with Coreg 6.25mg half tablet twice daily. He had been adherent with Lasix up until about few days ago due to urinary frequency. Significant improvement with peripheral edema and  with diuretic therapy. Activity level significantly limited by multiple joint and back pain. Continues to ambulate with a walker.  Patient denies any exertional chest pain, syncope, orthopnea or paroxysmal nocturnal dyspnea. He is here today with his daughter. 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 heart failure     COPD (chronic obstructive pulmonary disease) ([facility])     oxygen requiring    Coronary atherosclerosis of native coronary artery 1970's    MI, PCI LAD 2004    DDD (degenerative disc disease), lumbar     med rx    Interstitial lung disease ([facility]     Ischemic cardiomyopathy     PAD (peripheral artery disease) ([facility] [address]     right iliac stent    Pure hypercholesterolemia     PVC's (premature ventricular contractions) 6/11/2012    Shingles 2/2012    thoracic region    Venous insufficiency      Current Outpatient Prescriptions   Medication Sig Dispense Refill    carvedilol (COREG) 6.25 mg tablet Take 0.5 Tabs by mouth two (2) times daily (with meals). 180 Tab 3    furosemide (LASIX) 20 mg tablet Take 1 Tab by mouth daily. 30 Tab 3    MULTIVITAMIN PO Take  by mouth.  acetaminophen (TYLENOL) 325 mg tablet Take  by mouth every four (4) hours as needed for Pain.  albuterol-ipratropium (DUO-NEB) 2.5 mg-0.5 mg/3 ml nebulizer solution 3 mL by Nebulization route once.  IRON/VITAMIN B COMPLEX (GERITOL PO) Take  by mouth.  MISCELLANEOUS MEDICAL SUPPLY (OXYGEN TUBING) 2.5 L by Does Not Apply route.  predniSONE (DELTASONE) 2.5 mg tablet Take 5 mg by mouth daily.  atorvastatin (LIPITOR) 20 mg tablet Take 20 mg by mouth nightly.  budesonide-formoterol (SYMBICORT) 160-4.5 mcg/Actuation HFA inhaler Take 2 Puffs by inhalation two (2) times a day.  tiotropium (SPIRIVA WITH HANDIHALER) 18 mcg inhalation capsule Take 1 Cap by inhalation daily.  pantoprazole (PROTONIX) 40 mg tablet Take 40 mg by mouth daily. Allergies   Allergen Reactions    Ivp [Fd And C Blue No.1] Unknown (comments)     Review of Systems   Constitutional: Negative for fever, chills, fatigue and diaphoresis.    Respiratory: Negative for cough, dyspnea, hemoptysis, sputum production and wheezing. Positive for PRESSLEY. Cardiovascular: Negative for chest pain, orthopnea, claudication, leg swelling and PND. Positive for palpitations. Gastrointestinal: Negative for heartburn, nausea, vomiting, blood in stool and melena. Genitourinary: Negative for dysuria and flank pain. Musculoskeletal: Positive for back pain and joint pain. Skin: Negative for rash. Neurological: Negative for focal weakness, seizures, loss of consciousness, and headaches. Positive for generalized weakness. Endo/Heme/Allergies: Does bruise easily. No abnormal bleeding. Psychiatric/Behavioral: Negative for memory loss. The patient does not have insomnia. Visit Vitals    Ht 5' 6\" (1.676 m)    Wt 101 lb 12.8 oz (46.2 kg)    BMI 16.43 kg/m2     Wt Readings from Last 3 Encounters:   06/20/17 101 lb 12.8 oz (46.2 kg)   05/31/17 104 lb 3.2 oz (47.3 kg)   02/21/17 101 lb (45.8 kg)     Physical Exam   Vitals reviewed. Constitutional: He is oriented to person, place, and time. Neck: Neck supple. No JVD present. Cardiovascular: Normal rate, regular rhythm, S1 normal, S2 normal and normal heart sounds. Frequent extrasystoles are present. Exam reveals no gallop and no friction rub. No murmur heard. Pulses:       Carotid pulses are 2+ on the right side, and 2+ on the left side. Dorsalis pedis pulses are 2+ on the right side, and 2+ on the left side. Pulmonary/Chest: Effort normal and breath sounds normal. He has no wheezes. He has no rales. Abdominal: Soft. Bowel sounds are normal.   Musculoskeletal: trace-1+ peripheral edema. Neurological: He is alert and oriented to person, place, and time. Skin: Skin is warm and dry. Psychiatric: He has a normal mood and affect. Cardiographics  EKG 4/30/15 - SR with frequent PVCs and rare couplets  EKG 5/16/16 - SR 92, frequent PAC/PVC's.    EKG 11/2016 - SR, frequent PVC's, RBBB  EKG 06/20/17- regular tachycardia 136, RBBB, LAHB suspect atrial flutter or atrial tachycardia. ASSESSMENT and PLAN  Encounter Diagnoses   Name Primary?  Chronic systolic heart failure (HCC) Yes    Atherosclerosis of native coronary artery of native heart without angina pectoris     Frequent PVCs     Dilated cardiomyopathy (HCC)     Venous insufficiency     Atrial flutter, paroxysmal (HCC)     Atrial tachycardia (HCC)     Panlobular emphysema (HCC)     Chronic midline low back pain, with sciatica presence unspecified     Advanced care planning/counseling discussion      Mr Antonia Hernandes has advanced COPD, severe LV dysfunction with severe HF, severe chronic low back pain now with narrow complex tachycardia-atrial flutter vs. AT. He likely has sinus node dysfunction. His chronic conditions are advancing and I believe he is at the end of his life. He has a durable DNR in place. We had a long discussion about home hospice evaluation. I had a lengthy discussion with his daughter - she understands and agrees with the terminality of his conditions. She will think on this and get back to us. From a treatment standpoint, will increase Carvedilol to 6.25mg BID and convert Lasix to PRN dosing. Edema significantly improved. I do not feel that holter monitoring etc... Will be productive in this circumstance. Will check BMP today to evaluate electrolyte levels.        Written by Nata Hanson, as dictated by Reji Valdez MD.   Reji Valdez MD

## 2017-06-21 NOTE — TELEPHONE ENCOUNTER
Patients daughter Jadyn Simons called regarding order for heart monitor that was discussed. She also has a few questions regarding hospice care.  Please call her at [phone]

## 2017-06-24 PROBLEM — M54.50 CHRONIC MIDLINE LOW BACK PAIN: Status: ACTIVE | Noted: 2017-01-01

## 2017-06-24 PROBLEM — G89.29 CHRONIC MIDLINE LOW BACK PAIN: Status: ACTIVE | Noted: 2017-01-01

## 2017-06-27 NOTE — TELEPHONE ENCOUNTER
Patient and his caregiver notified that we will not move forward with holter monitor at this time. He voices understanding. BP today 144/78 P 61.

## 2017-07-02 NOTE — DISCHARGE INSTRUCTIONS
Cuts Closed With Adhesives: Care Instructions  Your Care Instructions  A cut can happen anywhere on your body. The doctor used an adhesive to close the cut. When the adhesive dries, it forms a film that holds the edges of the cut together. Skin adhesives are sometimes called liquid stitches. If the cut went deep and through the skin, the doctor may have put in a layer of stitches below the adhesive. The deeper layer of stitches brings the deep part of the cut together. These stitches will dissolve and don't need to be removed. You don't see the stitches, only the adhesive. You may have a bandage. The doctor has checked you carefully, but problems can develop later. If you notice any problems or new symptoms, get medical treatment right away. Follow-up care is a key part of your treatment and safety. Be sure to make and go to all appointments, and call your doctor if you are having problems. It's also a good idea to know your test results and keep a list of the medicines you take. How can you care for yourself at home? · Keep the cut dry for the first 24 to 48 hours. After this, you can shower if your doctor okays it. Pat the cut dry. · Don't soak the cut, such as in a bathtub. Your doctor will tell you when it's safe to get the cut wet. · If your doctor told you how to care for your cut, follow your doctor's instructions. If you did not get instructions, follow this general advice:  ¨ Do not put any kind of ointment, cream, or lotion over the area. This can make the adhesive fall off too soon. ¨ After the first 24 to 48 hours, wash around the cut with clean water 2 times a day. Do not use hydrogen peroxide or alcohol, which can slow healing. ¨ If the doctor told you to use a bandage, put on a new bandage after cleaning the cut or if the bandage gets wet or dirty. · Prop up the sore area on a pillow anytime you sit or lie down during the next 3 days. Try to keep it above the level of your heart. This will help reduce swelling. · Leave the skin adhesive on your skin until it falls off on its own. This may take 5 to 10 days. · Do not scratch, rub, or pick at the adhesive. · Do not put the sticky part of a bandage directly on the adhesive. · Avoid any activity that could cause your cut to reopen. · Be safe with medicines. Read and follow all instructions on the label. ¨ If the doctor gave you a prescription medicine for pain, take it as prescribed. ¨ If you are not taking a prescription pain medicine, ask your doctor if you can take an over-the-counter medicine. When should you call for help? Call your doctor now or seek immediate medical care if:  · You have new pain, or your pain gets worse. · The skin near the cut is cold or pale or changes color. · You have tingling, weakness, or numbness near the cut. · The cut starts to bleed. · You have trouble moving the area near the cut. · You have symptoms of infection, such as:  ¨ Increased pain, swelling, warmth, or redness around the cut. ¨ Red streaks leading from the cut. ¨ Pus draining from the cut. ¨ A fever. Watch closely for changes in your health, and be sure to contact your doctor if:  · The cut reopens. · You do not get better as expected. Where can you learn more? Go to http://radha-padmini.info/. Enter P174 in the search box to learn more about \"Cuts Closed With Adhesives: Care Instructions. \"  Current as of: March 20, 2017  Content Version: 11.3  © 5332-9603 [employer]. Care instructions adapted under license by [employer] (which disclaims liability or warranty for this information). If you have questions about a medical condition or this instruction, always ask your healthcare professional. [address] any warranty or liability for your use of this information. We hope that we have addressed all of your medical concerns.  The examination and treatment you received in the Emergency Department were for an emergent problem and were not intended as complete care. It is important that you follow up with your healthcare provider(s) for ongoing care. If your symptoms worsen or do not improve as expected, and you are unable to reach your usual health care provider(s), you should return to the Emergency Department. Today's healthcare is undergoing tremendous change, and patient satisfaction surveys are one of the many tools to assess the quality of medical care. You may receive a survey from the [employer] organization regarding your experience in the Emergency Department. I hope that your experience has been completely positive, particularly the medical care that I provided. As such, please participate in the survey; anything less than excellent does not meet my expectations or intentions. [facility] and [facility] participate in nationally recognized quality of care measures. If your blood pressure is greater than 120/80, as reported below, we urge that you seek medical care to address the potential of high blood pressure, commonly known as hypertension. Hypertension can be hereditary or can be caused by certain medical conditions, pain, stress, or \"white coat syndrome. \"       Please make an appointment with your health care provider(s) for follow up of your Emergency Department visit. VITALS:   Patient Vitals for the past 8 hrs:   Temp Pulse Resp BP SpO2   07/02/17 1800 - - - 137/66 91 %   07/02/17 1756 - - - - (!) 89 %   07/02/17 1730 - - - 140/88 91 %   07/02/17 1705 98.2 °F (36.8 °C) (!) 46 18 (!) 154/107 92 %          Thank you for allowing us to provide you with medical care today. We realize that you have many choices for your emergency care needs. Please choose us in the future for any continued health care needs. Heriberto Simental  [address]. Office: [phone]            No results found for this or any previous visit (from the past 24 hour(s)). No results found.

## 2017-07-02 NOTE — ED NOTES
Dermabond applied to both skin tears by VANIA Bledsoe. Bulky dressing applied to fourth digit on left hand to prevent bending of the knuckle.
Patient discharged by Peter, [address]. Papers given and questions answered. Home with family.
no drainage

## 2017-07-02 NOTE — ED PROVIDER NOTES
HPI Comments: Tia Odom is a 80 y.o. male  who presents by EMS to ER with c/o Patient presents with:  Fall  Shoulder Pain  Patient was wearing hip slippers and slipped in the kitchen and hit his right arm on the counter. Patient denies head injury or LOC. PAtient with abrasion to right hand, unsure of last tetanus vaccine. He specifically denies any fevers, chills, nausea, vomiting, chest pain, shortness of breath, headache, rash, diarrhea, abdominal pain, urinary/bowel changes, sweating or weight loss. PCP: Em Rodriguez MD   PMHx significant for: Past Medical History:  No date: Benign hypertensive heart disease without hear*  No date: COPD (chronic obstructive pulmonary disease) (*      Comment: oxygen requiring  1970's: Coronary atherosclerosis of native coronary ar*      Comment: MI, PCI LAD 2004  No date: DDD (degenerative disc disease), lumbar      Comment: med rx  No date: Interstitial lung disease ([facility] [address]  No date: Ischemic cardiomyopathy  No date: PAD (peripheral artery disease) ([facility] [address]      Comment: right iliac stent  No date: Pure hypercholesterolemia  6/11/2012: PVC's (premature ventricular contractions)  2/2012: Shingles      Comment: thoracic region  No date: Venous insufficiency   PSHx significant for: Past Surgical History:  2010: ECHO 2D ADULT      Comment: mild LV systolic dysfunction and left atrial                enlargement. ?PFO. The ejection fraction was                40-45%. 2010: HX HEART CATHETERIZATION      Comment: Left ventricular wall motion is diffusely                hypokinetic. Ejection Fraction is estimated at                35%. The catheterization showed non-ostructive                coronary artery disease.  No evidence of                intracardiac shunt by oximetry  2004: HX PTCA      Comment: Stent-LAD  Social Hx: Tobacco use: Smoking status: Former Smoker                                                              Packs/day: 0.00      Years: 0.00 Types: Pipe     Quit date: 12/29/1985  Smokeless status: Never Used                      ; EtOH use: The patient states he drinks 0 per week.; Illicit Drug use: Allergies:   -- Ivp (Fd And C Blue No.1) -- Unknown (comments)    There are no other complaints, changes or physical findings at this time. Patient is a 80 y.o. male presenting with fall and shoulder pain. The history is provided by the patient. Fall   The accident occurred less than 1 hour ago. The fall occurred while walking. He fell from a height of ground level. He landed on hard floor. Point of impact: right arm. The pain is at a severity of 2/10. The pain is mild. He was not ambulatory at the scene. There was no entrapment after the fall. There was no drug use involved in the accident. There was no alcohol use involved in the accident. Associated symptoms include laceration. Pertinent negatives include no visual change, no fever, no numbness, no abdominal pain, no bowel incontinence, no nausea, no vomiting, no hematuria, no headaches, no extremity weakness, no hearing loss, no loss of consciousness and no tingling. The risk factors include being elderly. He has tried nothing for the symptoms. It is unknown when the patient last had a tetanus shot. Shoulder Pain    Pertinent negatives include no numbness and no tingling.         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 heart failure     COPD (chronic obstructive pulmonary disease) (HCC)     oxygen requiring    Coronary atherosclerosis of native coronary artery 1970's    MI, PCI LAD 2004    DDD (degenerative disc disease), lumbar     med rx    Interstitial lung disease ([address]     Ischemic cardiomyopathy     PAD (peripheral artery disease) ([facility] [address]     right iliac stent    Pure hypercholesterolemia     PVC's (premature ventricular contractions) 6/11/2012    Shingles 2/2012    thoracic region    Venous insufficiency        Past Surgical History:   Procedure Laterality Date    ECHO 2D ADULT  2010    mild LV systolic dysfunction and left atrial enlargement. ?PFO. The ejection fraction was 40-45%.  HX HEART CATHETERIZATION  2010    Left ventricular wall motion is diffusely hypokinetic. Ejection Fraction is estimated at 35%. The catheterization showed non-ostructive coronary artery disease. No evidence of intracardiac shunt by oximetry    HX PTCA  2004    Stent-LAD         Family History:   Problem Relation Age of Onset    Arthritis-osteo Other              Social History     Social History    Marital status:      Spouse name: N/A    Number of children: N/A    Years of education: N/A     Occupational History    Not on file. Social History Main Topics    Smoking status: Former Smoker     Types: Pipe     Quit date: 12/29/1985    Smokeless tobacco: Never Used    Alcohol use No    Drug use: No    Sexual activity: Not on file     Other Topics Concern    Not on file     Social History Narrative         ALLERGIES: Ivp [fd and c blue no. 1]    Review of Systems   Constitutional: Negative. Negative for fever. HENT: Negative. Eyes: Negative. Respiratory: Negative. Cardiovascular: Negative. Gastrointestinal: Negative. Negative for abdominal pain, bowel incontinence, nausea and vomiting. Endocrine: Negative. Genitourinary: Negative. Negative for hematuria. Musculoskeletal: Negative. Negative for extremity weakness. Skin: Positive for wound. Allergic/Immunologic: Negative. Neurological: Negative. Negative for tingling, loss of consciousness, numbness and headaches. Hematological: Negative. Psychiatric/Behavioral: Negative. All other systems reviewed and are negative.       Vitals:    07/02/17 1705   BP: (!) 154/107   Pulse: (!) 46   Resp: 18   Temp: 98.2 °F (36.8 °C)   SpO2: 92%   Weight: 45.8 kg (101 lb)   Height: 5' 6\" (1.676 m)            Physical Exam   Constitutional: He is oriented to person, place, and time. He appears well-developed and well-nourished. HENT:   Head: Normocephalic and atraumatic. Right Ear: External ear normal.   Left Ear: External ear normal.   Mouth/Throat: Oropharynx is clear and moist. No oropharyngeal exudate. Eyes: Conjunctivae and EOM are normal. Pupils are equal, round, and reactive to light. Right eye exhibits no discharge. Left eye exhibits no discharge. No scleral icterus. Neck: Normal range of motion. Neck supple. No tracheal deviation present. No thyromegaly present. Cardiovascular: Normal rate, regular rhythm, normal heart sounds and intact distal pulses. No murmur heard. Pulmonary/Chest: Effort normal and breath sounds normal. No respiratory distress. He has no wheezes. He has no rales. Abdominal: Soft. Bowel sounds are normal. He exhibits no distension. There is no tenderness. There is no rebound and no guarding. Musculoskeletal: Normal range of motion. He exhibits no edema or tenderness. Hands:  Patient with full ROM to right arm at shoulder, elbow and wrist. No bony tenderness. No ecchymosis or deformity noted. Lymphadenopathy:     He has no cervical adenopathy. Neurological: He is alert and oriented to person, place, and time. No cranial nerve deficit. Coordination normal.   Skin: Skin is warm. Laceration noted. No rash noted. No erythema. Psychiatric: He has a normal mood and affect. His behavior is normal. Judgment and thought content normal.   Nursing note and vitals reviewed. MDM  Number of Diagnoses or Management Options  Laceration of finger, initial encounter:   Diagnosis management comments: Assesment/Plan- 80 year old male with lacerations and arm pain. Differential includes laceration, abrasion, contusion, fracture. No imaging needed patient moving arm with out difficulty. PAtient discharged with PCP follow up.     ED Course       Wound Closure by Adhesive  Date/Time: 7/2/2017 10:41 PM  Performed by: Corrina Hutchinson E  Authorized by: Jami Worrell     Consent:     Consent obtained:  Written    Consent given by:  Patient    Risks discussed:  Infection, pain, poor wound healing, need for additional repair and poor cosmetic result    Alternatives discussed:  No treatment  Anesthesia (see MAR for exact dosages): Anesthesia method:  None  Laceration details:     Location:  Finger    Finger location:  L ring finger    Length (cm):  0.5    Depth (mm):  0.1  Repair type:     Repair type:  Simple  Exploration:     Wound exploration: wound explored through full range of motion and entire depth of wound probed and visualized      Contaminated: no    Treatment:     Area cleansed with:  Anish    Amount of cleaning:  Standard    Visualized foreign bodies/material removed: no    Skin repair:     Repair method:  Tissue adhesive  Approximation:     Approximation:  Close    Vermilion border: well-aligned    Post-procedure details:     Dressing:  Bulky dressing    Patient tolerance of procedure:   Tolerated well, no immediate complications

## 2017-07-08 PROCEDURE — 0651 HSPC ROUTINE HOME CARE

## 2017-07-09 PROCEDURE — 0651 HSPC ROUTINE HOME CARE
